# Patient Record
Sex: FEMALE | Race: ASIAN | Employment: FULL TIME | URBAN - METROPOLITAN AREA
[De-identification: names, ages, dates, MRNs, and addresses within clinical notes are randomized per-mention and may not be internally consistent; named-entity substitution may affect disease eponyms.]

---

## 2014-01-01 LAB — PAP-ABSTRACT: NORMAL

## 2019-09-23 NOTE — PATIENT INSTRUCTIONS
Welcome to [facility], where we are committed to the art of inspired primary care.  Thank you for choosing us to be a part of your well-being.    The clinic is open Monday through Friday, 8:00 a.m. - 5:00 p.m., and Saturdays from 8:00 a.m. - 12:00 p.m.  After-hours questions are directed to our 24-hour nurse line, which can be reached by calling the clinic at [phone].  You can also contact the clinic through [employer], our online patient portal.  (Please allow 1-2 business days for a response via [employer].)  If you are not already enrolled in [employer], access instructions are below.    If you need a refill on your prescription, please contact the pharmacy where you filled it, and they will contact our clinic with the details of what is needed.  If your prescription is a controlled substance, you will have a conversation with your provider to determine if you would like to  your prescription at the clinic or have it mailed to your pharmacy.  Please allow 2-3 business days for all refill requests to be handled.    We look forward to providing you with great care!  Please let me know if you have any questions.  Thank you for allowing me to participate in your care.  It was my pleasure meeting you.  Meghan Smith PA-C    Preventive Health Recommendations  Female Ages 26 - 39  Yearly exam:   See your health care provider every year in order to    Review health changes.     Discuss preventive care.      Review your medicines if you your doctor has prescribed any.    Until age 30: Get a Pap test every three years (more often if you have had an abnormal result).    After age 30: Talk to your doctor about whether you should have a Pap test every 3 years or have a Pap test with HPV screening every 5 years.   You do not need a Pap test if your uterus was removed (hysterectomy) and you have not had cancer.  You should be tested each year for STDs (sexually transmitted diseases), if  you're at risk.   Talk to your provider about how often to have your cholesterol checked.  If you are at risk for diabetes, you should have a diabetes test (fasting glucose).  Shots: Get a flu shot each year. Get a tetanus shot every 10 years.   Nutrition:     Eat at least 5 servings of fruits and vegetables each day.    Eat whole-grain bread, whole-wheat pasta and brown rice instead of white grains and rice.    Get adequate Calcium and Vitamin D.     Lifestyle    Exercise at least 150 minutes a week (30 minutes a day, 5 days of the week). This will help you control your weight and prevent disease.    Limit alcohol to one drink per day.    No smoking.     Wear sunscreen to prevent skin cancer.    See your dentist every six months for an exam and cleaning.

## 2019-09-25 ENCOUNTER — OFFICE VISIT (OUTPATIENT)
Dept: FAMILY MEDICINE | Facility: CLINIC | Age: 29
End: 2019-09-25
Payer: COMMERCIAL

## 2019-09-25 VITALS
WEIGHT: 142 LBS | OXYGEN SATURATION: 97 % | BODY MASS INDEX: 22.82 KG/M2 | DIASTOLIC BLOOD PRESSURE: 68 MMHG | HEART RATE: 76 BPM | HEIGHT: 66 IN | SYSTOLIC BLOOD PRESSURE: 95 MMHG | TEMPERATURE: 97.9 F

## 2019-09-25 DIAGNOSIS — Z00.8 ENCOUNTER FOR BIOMETRIC SCREENING: Primary | ICD-10-CM

## 2019-09-25 LAB
ALT SERPL-CCNC: 15 U/L (ref 0–50)
AST SERPL-CCNC: 19 U/L (ref 0–45)
CHOLEST SERPL-MCNC: 250 MG/DL (ref 0–200)
CHOLEST/HDLC SERPL: 3.1 {RATIO} (ref 0–5)
FASTING SPECIMEN: YES
GLUCOSE SERPL-MCNC: 97 MG/DL (ref 60–99)
HBA1C MFR BLD: 5.3 % (ref 4.1–5.7)
HDLC SERPL-MCNC: 82 MG/DL
LDLC SERPL CALC-MCNC: 156 MG/DL (ref 0–129)
TRIGL SERPL-MCNC: 63 MG/DL (ref 0–150)
VLDL-CHOLESTEROL: 13 (ref 7–32)

## 2019-09-25 SDOH — HEALTH STABILITY: MENTAL HEALTH: HOW OFTEN DO YOU HAVE 6 OR MORE DRINKS ON ONE OCCASION?: NEVER

## 2019-09-25 SDOH — HEALTH STABILITY: MENTAL HEALTH: HOW OFTEN DO YOU HAVE A DRINK CONTAINING ALCOHOL?: 2-4 TIMES A MONTH

## 2019-09-25 SDOH — HEALTH STABILITY: MENTAL HEALTH: HOW MANY STANDARD DRINKS CONTAINING ALCOHOL DO YOU HAVE ON A TYPICAL DAY?: 1 OR 2

## 2019-09-25 ASSESSMENT — MIFFLIN-ST. JEOR: SCORE: 1389.51

## 2019-09-25 ASSESSMENT — PAIN SCALES - GENERAL: PAINLEVEL: NO PAIN (1)

## 2019-09-25 NOTE — LETTER
September 25, 2019      Carmela SAWYER Scanlon  [address]  [facility] [address]          Dear Carmela,    It was a pleasure meeting you today at the Clinic and we hope to see you again soon. Below are your lab results from today.  Diabetes screen is normal. Your cholesterol is a bit high but the HDL or good cholesterol is high as well.  The LDL is high as well.     Though I am not recommending a cholesterol lowering medication I am encouraging life style management to decrease cardiac risk.  This includes a heart healthy diet with plenty of fruits and vegetables, limited amounts of good fats--monounsaturated as well as limiting red and processed meats.  You can find more information about this on the American Heart Association website.  I am happy to send you more information or have you speak to a dietician if you are interested.  We should check your lipid profile every 1-2 years to monitor.    I faxed your biometric screening form as requested and have enclosed a copy for you.    Please let me know if you have any questions.  Thank you for allowing me to participate in your care.  It was my pleasure meeting you.      Sincerely,        Magaly Smith PA-C    Results for orders placed or performed in visit on 09/25/19   ABSTRACT PAP-NO CHARGE   Result Value Ref Range    PAP-ABSTRACT See Scanned Document    Lipid Panel Plus ([place])   Result Value Ref Range    FASTING SPECIMEN YES     Glucose 97.0 60.0 - 99.0 mg/dL    ALT 15.0 0.0 - 50.0 U/L    AST 19.0 0.0 - 45.0 U/L    Cholesterol 250.0 (H) 0.0 - 200.0    HDL Cholesterol 82.0 >50.0    Triglycerides 63.0 0.0 - 150.0    Cholesterol/HDL Ratio 3.1 0.0 - 5.0    LDL Cholesterol Direct 156.0 (H) 0.0 - 129.0    VLDL-Cholesterol 13.0 7.0 - 32.0   Hemoglobin A1c ([facility])   Result Value Ref Range    Hemoglobin A1C 5.3 4.1 - 5.7 %

## 2019-09-25 NOTE — PROGRESS NOTES
"   SUBJECTIVE:   CC: {Carmela Scanlon is an {28 year old woman who presents for preventive health visit. She is a new patient to our clinic and has been receiving health care ***.  Her last CPE was ***. She has a history of ***. She has the following concerns she would like addressed today:    1. {additional problems to add (Optional):423061}  2. {additional problems to add (Optional):879148}    GYN history:  Menarche: ***  Periods: ***regular, lasting ***days.  Flow described as ***  *** dysmenorrhea, *** Dysparuneia  Currently sexually active: ***  G***P***  Contraception: ***  {Patient's last menstrual period was 09/17/2019 (approximate).}  Vaginal symptoms: ***  Concern for STD: ***    Accepts/requests STD testing: ***  History of abnormal Pap smear: {PAP HX:732395}    Health maintenance:  Mammogram: ***  Colonoscopy: ***  PAP: No results found for: PAP    Immunizations:  ***  ***    Healthy Habits:    Do you get at least three servings of calcium containing foods daily (dairy, green leafy vegetables, etc.)? { :671699::\"yes\"}    Amount of exercise or daily activities, outside of work: { :243282}    Problems taking medications regularly { :573998::\"No\"}    Medication side effects: { :092359::\"No\"}    Have you had an eye exam in the past two years? { :018930}    Do you see a dentist twice per year? { :035014}    Do you have sleep apnea, excessive snoring or daytime drowsiness?{ :991830}  {Outside tests to abstract? :555327}    {additional problems to add (Optional):620592}    PHQ-2 Score:     PHQ-2 ( 1999 Pf[employer]) 9/25/2019   Q1: Little interest or pleasure in doing things 0   Q2: Feeling down, depressed or hopeless 1   PHQ-2 Score 1        PHQ-2 ( 1999 Pfizer) 9/25/2019   Q1: Little interest or pleasure in doing things 0   Q2: Feeling down, depressed or hopeless 1   PHQ-2 Score 1        There are no active problems to display for this patient.      Past Medical History:   Diagnosis Date     Influenza     " "Hospitalization/H1N1       Past Surgical History:   Procedure Laterality Date     wisdom teeth         Family History   Problem Relation Age of Onset     Hyperlipidemia Mother      Heart Disease Father      Coronary Artery Disease Father 50       Social History     Tobacco Use     Smoking status: Never Smoker     Smokeless tobacco: Never Used   Substance Use Topics     Alcohol use: Yes     Alcohol/week: 2.0 standard drinks     Types: 2 Standard drinks or equivalent per week     Frequency: 2-4 times a month     Drinks per session: 1 or 2     Binge frequency: Never       Social History     Patient does not qualify to have social determinant information on file (likely too young).   Social History Narrative     Not on file       No current outpatient medications on file.                          Reviewed orders with patient.  Reviewed health maintenance and updated orders accordingly - {Yes/No:553944::\"Yes\"}  {Chronicprobdata (Optional):219380}              ROS:  { :956169}    OBJECTIVE:   BP 95/68 (BP Location: Left arm, Patient Position: Chair, Cuff Size: Adult Regular)   Pulse 76   Temp 97.9  F (36.6  C) (Oral)   Ht 1.666 m (5' 5.6\")   Wt 64.4 kg (142 lb)   LMP 09/17/2019 (Approximate)   SpO2 97%   BMI 23.20 kg/m      EXAM:  {Exam Choices:448718}      ASSESSMENT/PLAN:   {Diag Picklist:779007}    COUNSELING:   {FEMALE COUNSELING MESSAGES:447557::\"Reviewed preventive health counseling, as reflected in patient instructions\"}    BP Readings from Last 3 Encounters:   09/25/19 95/68      Body mass index is 23.2 kg/m .      {BP Counseling- Complete if BP >= 120/80  (Optional):629786}  {Weight Management Plan (ACO) Complete if BMI is abnormal-  Ages 18-64  BMI >24.9.  Age 65+ with BMI <23 or >30 (Optional):279778}    History   Smoking Status     Never Smoker   Smokeless Tobacco     Never Used        {Tobacco Cessation -- Complete if patient is a smoker (Optional):043590}    Counseling Resources:  ATP IV " Guidelines  Pooled Cohorts Equation Calculator  Breast Cancer Risk Calculator  FRAX Risk Assessment  ICSI Preventive Guidelines  Dietary Guidelines for Americans, 2010  USDA's MyPlate  ASA Prophylaxis  Lung CA Screening    Magaly Smith PA-C  [facility]

## 2019-09-25 NOTE — PROGRESS NOTES
Inna Scanlon is a 28 year old female who presents to clinic to establish care. She is originally from [place]. Her appointment was scheduled as a physical, however she made it clear she did not want a physical today. She considers herself to be moderately healthy. She is requesting a biometric screening form to be filled out today. She is fasting.    All histories were reviewed.    There are no active problems to display for this patient.    Past Medical History:   Diagnosis Date     Influenza     Hospitalization/H1N1     Past Surgical History:   Procedure Laterality Date     wisdom teeth       Family History   Problem Relation Age of Onset     Hyperlipidemia Mother      Heart Disease Father      Coronary Artery Disease Father 50     Social History     Tobacco Use     Smoking status: Never Smoker     Smokeless tobacco: Never Used   Substance Use Topics     Alcohol use: Yes     Alcohol/week: 2.0 standard drinks     Types: 2 Standard drinks or equivalent per week     Frequency: 2-4 times a month     Drinks per session: 1 or 2     Binge frequency: Never       Social History     Patient does not qualify to have social determinant information on file (likely too young).   Social History Narrative     Not on file       No current outpatient medications on file.       Reviewed and updated as needed this visit by Provider  Tobacco  Allergies  Meds  Problems  Med Hx  Surg Hx  Fam Hx  Soc Hx        Review of Systems   ROS COMP: CONSTITUTIONAL: NEGATIVE for fever, chills, change in weight  INTEGUMENTARY/SKIN: NEGATIVE for worrisome rashes, moles or lesions  EYES: NEGATIVE for vision changes or irritation  ENT/MOUTH: NEGATIVE for ear, mouth and throat problems  RESP: NEGATIVE for significant cough or SOB  BREAST: NEGATIVE for masses, tenderness or discharge  CV: NEGATIVE for chest pain, palpitations or peripheral edema  GI: NEGATIVE for nausea, abdominal pain, heartburn, or change in bowel habits  :  "NEGATIVE for frequency, dysuria, or hematuria  MUSCULOSKELETAL: NEGATIVE for significant arthralgias or myalgia  NEURO: NEGATIVE for weakness, dizziness or paresthesias  ENDOCRINE: NEGATIVE for temperature intolerance, skin/hair changes  HEME: NEGATIVE for bleeding problems  PSYCHIATRIC: NEGATIVE for changes in mood or affect      Objective    BP 95/68 (BP Location: Left arm, Patient Position: Chair, Cuff Size: Adult Regular)   Pulse 76   Temp 97.9  F (36.6  C) (Oral)   Ht 1.666 m (5' 5.6\")   Wt 64.4 kg (142 lb)   LMP 09/17/2019 (Approximate)   SpO2 97%   BMI 23.20 kg/m    Body mass index is 23.2 kg/m .  Physical Exam   GENERAL: healthy, alert and no distress  RESP: lungs clear to auscultation - no rales, rhonchi or wheezes  CV: regular rate and rhythm, normal S1 S2, no S3 or S4, no murmur, click or rub, no peripheral edema and peripheral pulses strong  PSYCH: well dressed and groomed.  Good eye contact and is cooperative. Thoughts linear.  No delusions, compulsions or paranoia.  Affect bright.  Patient denies homicidal and suicidal ideation as well as no thoughts or actions of self-harm.        Assessment & Plan       ICD-10-CM    1. Encounter for biometric screening Z01.89 Lipid Panel Plus ([place])     Hemoglobin A1c ([facility])     Advised patient to bring her immunization record to clinic and consider CPE in the near future. She is due for a PAP test.  Follow up if you have any worsening or persistent problems or concerns.      Magaly Smith PA-C  [facility]    [place] am serving as a scribe to document services personally performed by Magaly Smith PA-C, based on data collection and the provider's statements to me. Magaly Smith PA-C, has reviewed, edited, and approved the above note.   "

## 2019-09-25 NOTE — NURSING NOTE
"28 year old  Chief Complaint   Patient presents with     Establish Care     needs to fill out the Biometric screening form.       Blood pressure 95/68, pulse 76, temperature 97.9  F (36.6  C), temperature source Oral, height 1.666 m (5' 5.6\"), weight 64.4 kg (142 lb), last menstrual period 09/17/2019, SpO2 97 %. Body mass index is 23.2 kg/m .  There is no problem list on file for this patient.      Wt Readings from Last 2 Encounters:   09/25/19 64.4 kg (142 lb)     BP Readings from Last 3 Encounters:   09/25/19 95/68         No current outpatient medications on file.     No current facility-administered medications for this visit.        Social History     Tobacco Use     Smoking status: Never Smoker     Smokeless tobacco: Never Used   Substance Use Topics     Alcohol use: Yes     Drug use: Never       Health Maintenance Due   Topic Date Due     HIV SCREENING  10/04/2006     PAP  10/04/2012     DTAP/TDAP/TD IMMUNIZATION (1 - Tdap) 10/04/2016     PHQ-2  01/01/2019     INFLUENZA VACCINE (1) 09/01/2019       No results found for: MITA Mora CMA, CMA  September 25, 2019 8:09 AM  "

## 2019-09-27 SDOH — HEALTH STABILITY: MENTAL HEALTH
STRESS IS WHEN SOMEONE FEELS TENSE, NERVOUS, ANXIOUS, OR CAN'T SLEEP AT NIGHT BECAUSE THEIR MIND IS TROUBLED. HOW STRESSED ARE YOU?: TO SOME EXTENT

## 2019-09-27 SDOH — HEALTH STABILITY: PHYSICAL HEALTH: ON AVERAGE, HOW MANY DAYS PER WEEK DO YOU ENGAGE IN MODERATE TO STRENUOUS EXERCISE (LIKE A BRISK WALK)?: 0 DAYS
